# Patient Record
Sex: FEMALE | URBAN - METROPOLITAN AREA
[De-identification: names, ages, dates, MRNs, and addresses within clinical notes are randomized per-mention and may not be internally consistent; named-entity substitution may affect disease eponyms.]

---

## 2018-06-20 ENCOUNTER — IMPORTED ENCOUNTER (OUTPATIENT)
Dept: URBAN - METROPOLITAN AREA CLINIC 1 | Facility: CLINIC | Age: 23
End: 2018-06-20

## 2018-06-20 PROBLEM — H10.45: Noted: 2018-06-20

## 2018-06-20 PROBLEM — H20.011: Noted: 2018-06-20

## 2018-06-20 PROCEDURE — 92004 COMPRE OPH EXAM NEW PT 1/>: CPT

## 2018-06-20 NOTE — PATIENT DISCUSSION
1.  Iritis: Primary episode OD: Begin Pred forte QID OD. Return immediately if symptoms worsen. Patient has h/o IBS. No signs of corneal Abrasion/Ulcer2. Allergic Conjunctivitis OU :  Condition discussed with patient. Advised patient to use OTC Zaditor one drop OU BID prn for itching. Return for an appointment in Monday 10 with Dr. Artie Duque.

## 2018-06-20 NOTE — PATIENT DISCUSSION
Allergic Conjunctivitis OU :  Condition discussed with patient. Advised patient to use OTC Zaditor one drop OU BID prn for itching.

## 2022-04-02 ASSESSMENT — TONOMETRY
OS_IOP_MMHG: 15
OD_IOP_MMHG: 14

## 2022-04-02 ASSESSMENT — VISUAL ACUITY
OS_SC: 20/25
OD_PH: CC 20/40 -2
OD_SC: 20/50